# Patient Record
Sex: FEMALE | Race: BLACK OR AFRICAN AMERICAN | NOT HISPANIC OR LATINO | Employment: UNEMPLOYED | URBAN - METROPOLITAN AREA
[De-identification: names, ages, dates, MRNs, and addresses within clinical notes are randomized per-mention and may not be internally consistent; named-entity substitution may affect disease eponyms.]

---
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
## 2024-05-17 NOTE — LACTATION NOTE
"Spoke with pt in room.  Baby asleep at this time, without signs of hunger cues. Reviewed breastfeeding basics.  Encouraged to call to call for latch check with next feeding and prn assist.  States "understand" and verbalized appropriate recall.  " Price (Do Not Change): 0.00 Instructions: This plan will send the code FBSE to the PM system.  DO NOT or CHANGE the price. Detail Level: Simple